# Patient Record
Sex: FEMALE
[De-identification: names, ages, dates, MRNs, and addresses within clinical notes are randomized per-mention and may not be internally consistent; named-entity substitution may affect disease eponyms.]

---

## 2017-08-09 ENCOUNTER — HOSPITAL ENCOUNTER (EMERGENCY)
Dept: HOSPITAL 31 - C.ER | Age: 52
Discharge: LEFT BEFORE DISCHARGE | End: 2017-08-09
Payer: COMMERCIAL

## 2017-08-09 VITALS — HEART RATE: 69 BPM | DIASTOLIC BLOOD PRESSURE: 68 MMHG | TEMPERATURE: 98 F | SYSTOLIC BLOOD PRESSURE: 111 MMHG

## 2017-08-09 VITALS — RESPIRATION RATE: 16 BRPM

## 2017-08-09 DIAGNOSIS — R10.31: Primary | ICD-10-CM

## 2017-08-09 LAB
ALBUMIN/GLOB SERPL: 1.4 {RATIO} (ref 1–2.1)
ALP SERPL-CCNC: 59 U/L (ref 38–126)
ALT SERPL-CCNC: 26 U/L (ref 9–52)
AST SERPL-CCNC: 19 U/L (ref 14–36)
BACTERIA #/AREA URNS HPF: (no result) /[HPF]
BASOPHILS # BLD AUTO: 0.1 K/UL (ref 0–0.2)
BASOPHILS NFR BLD: 1 % (ref 0–2)
BILIRUB SERPL-MCNC: 0.6 MG/DL (ref 0.2–1.3)
BILIRUB UR-MCNC: NEGATIVE MG/DL
BUN SERPL-MCNC: 12 MG/DL (ref 7–17)
CALCIUM SERPL-MCNC: 9.2 MG/DL (ref 8.6–10.4)
CHLORIDE SERPL-SCNC: 102 MMOL/L (ref 98–107)
CO2 SERPL-SCNC: 28 MMOL/L (ref 22–30)
EOSINOPHIL # BLD AUTO: 0.1 K/UL (ref 0–0.7)
EOSINOPHIL NFR BLD: 2 % (ref 0–4)
ERYTHROCYTE [DISTWIDTH] IN BLOOD BY AUTOMATED COUNT: 14.1 % (ref 11.5–14.5)
GLOBULIN SER-MCNC: 2.7 GM/DL (ref 2.2–3.9)
GLUCOSE SERPL-MCNC: 82 MG/DL (ref 65–105)
GLUCOSE UR STRIP-MCNC: NORMAL MG/DL
HCT VFR BLD CALC: 38.7 % (ref 34–47)
KETONES UR STRIP-MCNC: NEGATIVE MG/DL
LEUKOCYTE ESTERASE UR-ACNC: (no result) LEU/UL
LYMPHOCYTES # BLD AUTO: 2.5 K/UL (ref 1–4.3)
LYMPHOCYTES NFR BLD AUTO: 34.7 % (ref 20–40)
MCH RBC QN AUTO: 31.3 PG (ref 27–31)
MCHC RBC AUTO-ENTMCNC: 34 G/DL (ref 33–37)
MCV RBC AUTO: 92.1 FL (ref 81–99)
MONOCYTES # BLD: 0.4 K/UL (ref 0–0.8)
MONOCYTES NFR BLD: 6.1 % (ref 0–10)
NRBC BLD AUTO-RTO: 0 % (ref 0–2)
PH UR STRIP: 6 [PH] (ref 5–8)
PLATELET # BLD: 127 K/UL (ref 130–400)
PMV BLD AUTO: 10.7 FL (ref 7.2–11.7)
POTASSIUM SERPL-SCNC: 3.8 MMOL/L (ref 3.6–5.2)
PROT SERPL-MCNC: 6.7 G/DL (ref 6.3–8.3)
PROT UR STRIP-MCNC: NEGATIVE MG/DL
RBC # UR STRIP: NEGATIVE /UL
RBC #/AREA URNS HPF: 2 /HPF (ref 0–3)
SODIUM SERPL-SCNC: 139 MMOL/L (ref 132–148)
SP GR UR STRIP: 1.02 (ref 1–1.03)
UROBILINOGEN UR-MCNC: NORMAL MG/DL (ref 0.2–1)
WBC # BLD AUTO: 7.1 K/UL (ref 4.8–10.8)
WBC #/AREA URNS HPF: 2 /HPF (ref 0–5)

## 2017-08-09 PROCEDURE — 99285 EMERGENCY DEPT VISIT HI MDM: CPT

## 2017-08-09 PROCEDURE — 80053 COMPREHEN METABOLIC PANEL: CPT

## 2017-08-09 PROCEDURE — 71010: CPT

## 2017-08-09 PROCEDURE — 83690 ASSAY OF LIPASE: CPT

## 2017-08-09 PROCEDURE — 81001 URINALYSIS AUTO W/SCOPE: CPT

## 2017-08-09 PROCEDURE — 85025 COMPLETE CBC W/AUTO DIFF WBC: CPT

## 2017-08-09 PROCEDURE — 96374 THER/PROPH/DIAG INJ IV PUSH: CPT

## 2017-08-09 NOTE — C.PDOC
History Of Present Illness


52 year old female who presents to the ER with a complaint of RLQ pain since 

yesterday afternoon, associated with some nausea. Denies diarrhea, dysuria, or 

hematuria.


Chief Complaint (Nursing): Abdominal Pain


History Per: Patient


History/Exam Limitations: no limitations


Onset/Duration Of Symptoms: Days


Current Symptoms Are (Timing): Still Present


Location Of Pain/Discomfort: RLQ


Radiation Of Pain To:: None


Quality Of Discomfort: Unable To Describe


Associated Symptoms: Nausea.  denies: Fever, Chills, Vomiting, Diarrhea, 

Urinary Symptoms


Exacerbating Factors: None


Alleviating Factors: None


Recent travel outside of the [place]: No


Abnormal Vaginal Bleeding: No





Past Medical History


Reviewed: Historical Data, Nursing Documentation, Vital Signs


Vital Signs: 


 Last Vital Signs











Temp  98 F   08/09/17 21:35


 


Pulse  69   08/09/17 21:35


 


Resp  16   08/09/17 21:35


 


BP  111/68   08/09/17 21:35


 


Pulse Ox  100   08/09/17 21:35














- Medical History


PMH: Anemia


Surgical History: No Surg Hx


Family History: States: Unknown Family Hx





- Social History


Hx Alcohol Use: No


Hx Substance Use: No





- Immunization History


Hx Influenza Vaccination: Yes





Review Of Systems


Constitutional: Negative for: Fever, Chills


Gastrointestinal: Positive for: Nausea, Abdominal Pain.  Negative for: Vomiting

, Diarrhea


Genitourinary: Negative for: Dysuria, Hematuria





Physical Exam





- Physical Exam


Appears: Non-toxic


Skin: Normal Color, Warm, Dry


Head: Atraumatic, Normacephalic


Oral Mucosa: Moist


Chest: Symmetrical, No Tenderness


Cardiovascular: Rhythm Regular, No Murmur


Respiratory: Normal Breath Sounds, No Rales, No Rhonchi, No Wheezing


Gastrointestinal/Abdominal: Soft, Tenderness (RLQ), No Guarding, No Rebound


Neurological/Psych: Oriented x3, Normal Speech, Normal Cognition





ED Course And Treatment





- Laboratory Results


Result Diagrams: 


 08/09/17 19:27





 08/09/17 19:27


O2 Sat by Pulse Oximetry: 97 (Room air)


Pulse Ox Interpretation: Normal


Progress Note: CT abd/pel and CXR ordered. Toradol and IV fluids administered.  

Patient refuses further work up or CT; she states she feels better and will 

sign out AMA.





Against Medical Advice





- AMA


Patient Left Against Medical Advice: 


The patient declines admission to the hospital and wishes to leave the 

Emergency Department.  This action is against my medical advice. This decision 

was made with informed refusal. The patient was told that admission to the 

hospital is necessary.  Explanation of the reasons why were discussed.  


The risks of leaving were explained to the patient and include, but are not 

limited to, worsening of known or currently unknown conditions, permanent 

disability and death from undiagnosed or untreated conditions. 


The patient has the capacity to make this informed decision and understands my 

explanation of the current medical problem and risks of leaving. 


The patient voluntarily accepts these risks and signed an AMA form documenting 

our conversation.


The patient was given the opportunity to ask questions and reconsider.  The 

patient was encouraged to return to the Emergency Department at any time for 

further care.








Disposition


Counseled Patient/Family Regarding: Diagnosis





- Disposition


Disposition: AGAINST MEDICAL ADVICE


Disposition Time: 21:25


Condition: STABLE


Forms:  [facility] (English)





- POA


Present On Arrival: None





- Clinical Impression


Clinical Impression: 


 Abdominal pain








- Scribe Statement


The provider has reviewed the documentation as recorded by the Scribjocelyn Pitt





All medical record entries made by the Justin were at my direction and 

personally dictated by me. I have reviewed the chart and agree that the record 

accurately reflects my personal performance of the history, physical exam, 

medical decision making, and the department course for this patient. I have 

also personally directed, reviewed, and agree with the discharge instructions 

and disposition.

## 2017-08-10 VITALS — OXYGEN SATURATION: 97 %

## 2017-08-10 NOTE — RAD
PROCEDURE:  CHEST RADIOGRAPH, 1 VIEW



HISTORY:

Abdominal pain 



COMPARISON:

None available.



FINDINGS:



LUNGS:

Mild venous congestion.  Bibasilar breast and nipple shadows.  Small 

nodular density at the right lung base may represent vessel on end.  

Biapical pleural thickening with upper lobe granulomatous changes.



PLEURA:

No pneumothorax or pleural fluid seen.



CARDIOVASCULAR:

Normal.



OSSEOUS STRUCTURES:

No significant abnormalities.



VISUALIZED UPPER ABDOMEN:

Normal.



OTHER FINDINGS:

None. 



IMPRESSION:

Mild venous congestion.  Bibasilar breast and nipple shadows.  Small 

nodular density at the right lung base may represent vessel on end.  

Biapical pleural thickening with upper lobe granulomatous changes.

## 2018-04-30 ENCOUNTER — HOSPITAL ENCOUNTER (EMERGENCY)
Dept: HOSPITAL 31 - C.ER | Age: 53
Discharge: HOME | End: 2018-04-30
Payer: COMMERCIAL

## 2018-04-30 VITALS
RESPIRATION RATE: 18 BRPM | SYSTOLIC BLOOD PRESSURE: 120 MMHG | DIASTOLIC BLOOD PRESSURE: 72 MMHG | TEMPERATURE: 98 F | HEART RATE: 70 BPM

## 2018-04-30 VITALS — OXYGEN SATURATION: 98 %

## 2018-04-30 DIAGNOSIS — D64.9: ICD-10-CM

## 2018-04-30 DIAGNOSIS — R55: Primary | ICD-10-CM

## 2018-04-30 DIAGNOSIS — R10.9: ICD-10-CM

## 2018-04-30 LAB
ALBUMIN SERPL-MCNC: 4.1 G/DL (ref 3.5–5)
ALBUMIN/GLOB SERPL: 1.3 {RATIO} (ref 1–2.1)
ALT SERPL-CCNC: 14 U/L (ref 9–52)
AST SERPL-CCNC: 23 U/L (ref 14–36)
BACTERIA #/AREA URNS HPF: (no result) /[HPF]
BASOPHILS # BLD AUTO: 0.1 K/UL (ref 0–0.2)
BASOPHILS NFR BLD: 1.2 % (ref 0–2)
BILIRUB UR-MCNC: NEGATIVE MG/DL
BUN SERPL-MCNC: 14 MG/DL (ref 7–17)
CALCIUM SERPL-MCNC: 9.1 MG/DL (ref 8.6–10.4)
EOSINOPHIL # BLD AUTO: 0.2 K/UL (ref 0–0.7)
EOSINOPHIL NFR BLD: 2.3 % (ref 0–4)
ERYTHROCYTE [DISTWIDTH] IN BLOOD BY AUTOMATED COUNT: 13.9 % (ref 11.5–14.5)
GFR NON-AFRICAN AMERICAN: > 60
GLUCOSE UR STRIP-MCNC: NORMAL MG/DL
HGB BLD-MCNC: 14.1 G/DL (ref 11–16)
LEUKOCYTE ESTERASE UR-ACNC: (no result) LEU/UL
LIPASE: 74 U/L (ref 23–300)
LYMPHOCYTES # BLD AUTO: 2.6 K/UL (ref 1–4.3)
LYMPHOCYTES NFR BLD AUTO: 37.5 % (ref 20–40)
MCH RBC QN AUTO: 31.8 PG (ref 27–31)
MCHC RBC AUTO-ENTMCNC: 34.9 G/DL (ref 33–37)
MCV RBC AUTO: 91.1 FL (ref 81–99)
MONOCYTES # BLD: 0.4 K/UL (ref 0–0.8)
MONOCYTES NFR BLD: 6.1 % (ref 0–10)
NEUTROPHILS # BLD: 3.6 K/UL (ref 1.8–7)
NEUTROPHILS NFR BLD AUTO: 52.9 % (ref 50–75)
NRBC BLD AUTO-RTO: 0 % (ref 0–2)
PH UR STRIP: 6 [PH] (ref 5–8)
PLATELET # BLD: 159 K/UL (ref 130–400)
PMV BLD AUTO: 10.7 FL (ref 7.2–11.7)
PROT UR STRIP-MCNC: NEGATIVE MG/DL
RBC # BLD AUTO: 4.44 MIL/UL (ref 3.8–5.2)
RBC # UR STRIP: NEGATIVE /UL
SP GR UR STRIP: 1.02 (ref 1–1.03)
SQUAMOUS EPITHIAL: 8 /HPF (ref 0–5)
UROBILINOGEN UR-MCNC: 2 MG/DL (ref 0.2–1)
WBC # BLD AUTO: 6.8 K/UL (ref 4.8–10.8)

## 2018-04-30 PROCEDURE — 85025 COMPLETE CBC W/AUTO DIFF WBC: CPT

## 2018-04-30 PROCEDURE — 99285 EMERGENCY DEPT VISIT HI MDM: CPT

## 2018-04-30 PROCEDURE — 96361 HYDRATE IV INFUSION ADD-ON: CPT

## 2018-04-30 PROCEDURE — 93005 ELECTROCARDIOGRAM TRACING: CPT

## 2018-04-30 PROCEDURE — 81001 URINALYSIS AUTO W/SCOPE: CPT

## 2018-04-30 PROCEDURE — 74177 CT ABD & PELVIS W/CONTRAST: CPT

## 2018-04-30 PROCEDURE — 83690 ASSAY OF LIPASE: CPT

## 2018-04-30 PROCEDURE — 80053 COMPREHEN METABOLIC PANEL: CPT

## 2018-04-30 PROCEDURE — 96374 THER/PROPH/DIAG INJ IV PUSH: CPT

## 2018-04-30 NOTE — CT
PROCEDURE:  CT Abdomen and Pelvis with contrast



HISTORY:

RLQ abd pain



COMPARISON:

None.



TECHNIQUE:

Contrast dose: 100 mL Visipaque 320



Radiation dose:



Total exam DLP = 308.70 mGy-cm.



This CT exam was performed using one or more of the following dose 

reduction techniques: Automated exposure control, adjustment of the 

mA and/or kV according to patient size, and/or use of iterative 

reconstruction technique.



FINDINGS:



LOWER THORAX:

Unremarkable. 



LIVER:

Unremarkable. No gross lesion or ductal dilatation. 



GALLBLADDER AND BILE DUCTS:

Unremarkable. 



PANCREAS:

Unremarkable. No gross lesion or ductal dilatation.



SPLEEN:

Unremarkable. 



ADRENALS:

Unremarkable. No mass. 



KIDNEYS AND URETERS:

Unremarkable. No hydronephrosis. No solid mass. 



VASCULATURE:

Pelvic varices. No evidence of abdominal aortic aneurysm. 



BOWEL:

No evidence of bowel obstruction. 



APPENDIX:

Not identified. No secondary findings. 



PERITONEUM:

Unremarkable. No free fluid. No free air. 



LYMPH NODES:

Unremarkable. No enlarged lymph nodes. 



BLADDER:

Unremarkable. 



REPRODUCTIVE:

Unremarkable. 



BONES:

No acute fracture. 



OTHER FINDINGS:

None.



IMPRESSION:

No acute abnormality.  Incidentally noted pelvic varices. Please 

correlate with any symptoms suggestive of pelvic congestion syndrome. 

Otherwise unremarkable.

## 2018-04-30 NOTE — C.PDOC
History Of Present Illness


52-year-old female, presents to the emergency department with complaints of 

syncopal episode today while she was at work. Patient states she has not been 

feeling like herself for a week. She is not sleeping well and has poor appetite 

for the past two days. Patient states she is feeling exhausted. Today she 

developed a sharp pain to right lower quadrant, and is not sure if that caused 

her syncope. Denies nausea/vomiting, fever or chills. Of note, patients last 

bowel movement was yesterday.


Time Seen by Provider: 18 11:53


Chief Complaint (Nursing): Weakness/Neurological Deficit


History Per: Patient


History/Exam Limitations: no limitations


Current Symptoms Are (Timing): Still Present


Severity: Moderate





Past Medical History


Reviewed: Historical Data, Nursing Documentation, Vital Signs


Vital Signs: 


 Last Vital Signs











Temp  98 F   18 16:40


 


Pulse  70   18 16:40


 


Resp  18   18 16:40


 


BP  120/72   18 16:40


 


Pulse Ox  99   18 16:40














- Medical History


PMH: Anemia


Family History: States: No Known Family Hx





- Social History


Hx Alcohol Use: No


Hx Substance Use: No





- Immunization History


Hx Influenza Vaccination: Yes





Physical Exam





- Physical Exam


Appears: Non-toxic, No Acute Distress, Other (drinking coffee)


Skin: Normal Color, Warm, Dry, No Rash


Head: Normacephalic


Eye(s): bilateral: PERRL


Nose: Normal


Oral Mucosa: Moist


Lips: Normal Appearing


Neck: Normal ROM


Chest: Symmetrical


Cardiovascular: Rhythm Regular


Respiratory: Normal Breath Sounds, No Accessory Muscle Use


Gastrointestinal/Abdominal: Soft, Tenderness (RLQ), No Guarding, No Rebound


Extremity: Normal ROM, No Deformity, No Swelling


Neurological/Psych: Oriented x3, Normal Speech





ED Course And Treatment





- Laboratory Results


Result Diagrams: 


 18 12:23





 18 12:23


Lab Interpretation: No Acute Changes


ECG: Interpreted By Me, Viewed By Me


ECG Rhythm: Sinus Rhythm


ECG Interpretation: No Acute Changes


Rate From EC


O2 Sat by Pulse Oximetry: 98 (RA)


Pulse Ox Interpretation: Normal





- CT Scan/US


  ** CT abd/pel


Other Rad Studies (CT/US): Read By Radiologist, Radiology Report Reviewed


CT/US Interpretation: Accession No. : [id number].  Patient Name / ID : 

WONG GANN  / [id number].  Exam Date : 2018 14:51:23 ( Approved ).  

Study Comment :  Sex / Age : F  / 52Y.  Creator : Steph Leary.  Dictator : 

Artemio Ortega MD.   :  Approver : Artemio Ortega MD.  

Approver2 :  Report Date : 2018 15:09:06.  My Comment :  *****************

******************************************************************.  PROCEDURE:

  CT Abdomen and Pelvis with contrast.  HISTORY:  RLQ abd pain.  COMPARISON:  

None.  TECHNIQUE:  Contrast dose: 100 mL Visipaque 320.  Radiation dose:  Total 

exam DLP = 308.70 mGy-cm.  This CT exam was performed using one or more of the 

following dose reduction techniques: Automated exposure control, adjustment of 

the mA and/or kV according to patient size, and/or use of iterative 

reconstruction technique.  FINDINGS:  LOWER THORAX:  Unremarkable.  LIVER:  

Unremarkable. No gross lesion or ductal dilatation.  GALLBLADDER AND BILE DUCTS

:  Unremarkable.  PANCREAS:  Unremarkable. No gross lesion or ductal 

dilatation.  SPLEEN:  Unremarkable.  ADRENALS:  Unremarkable. No mass.  KIDNEYS 

AND URETERS:  Unremarkable. No hydronephrosis. No solid mass.  VASCULATURE:  

Pelvic varices. No evidence of abdominal aortic aneurysm.  BOWEL:  No evidence 

of bowel obstruction.  APPENDIX:  Not identified. No secondary findings.  

PERITONEUM:  Unremarkable. No free fluid. No free air.  LYMPH NODES:  

Unremarkable. No enlarged lymph nodes.  BLADDER:  Unremarkable.  REPRODUCTIVE:  

Unremarkable.  BONES:  No acute fracture.  OTHER FINDINGS:  None.  IMPRESSION:  

No acute abnormality.  Incidentally noted pelvic varices. Please correlate with 

any symptoms suggestive of pelvic congestion syndrome. Otherwise unremarkable.





Medical Decision Making


Medical Decision Making: 


Impression: Abdominal pain, syncope





Plan:


* CT Abd/Pel


* EKG


* Bloodwork


* IVF, Toradol


* UA


* Reassess and Disposition





Progress:


Labs reviewed and no leukocytosis or acute abnormality. 


CT shows no acute abnormality. Denies any pelvic pain or vaginal complaints. 


Patient re-evaluated seated comfortably in no distress. She reports feeling 

better. She has stable vital signs. Discussed results with patient, and copy of 

report was provided.  Patient feels comfortable going home and will be 

discharged. Patient given follow up instructions. Instructed to return to ER if 

symptoms worsen or new symptoms arise.





Disposition


Counseled Patient/Family Regarding: Studies Performed, Diagnosis, Need For 

Followup





- Disposition


Referrals: 


Denny Shaffer DO [Staff Provider] - 


Disposition: HOME/ ROUTINE


Disposition Time: 16:02


Condition: STABLE


Additional Instructions: 


Follow up with your primary medical doctor in 2-5 days for further evaluation. 

Return to the emergency department at any time if symptoms persist or worsen.





Instructions:  Acute Abdomen (Belly Pain), Adult (DC), Syncope (Fainting) (DC)


Forms:  [facility] (English), Work Excuse





- POA


Present On Arrival: None





- Clinical Impression


Clinical Impression: 


 Syncope, Abdominal pain








- Scribe Statement


The provider has reviewed the documentation as recorded by the Scribe (Laila Dalton)








All medical record entries made by the Scribe were at my direction and 

personally dictated by me. I have reviewed the chart and agree that the record 

accurately reflects my personal performance of the history, physical exam, 

medical decision making, and the department course for this patient. I have 

also personally directed, reviewed, and agree with the discharge instructions 

and disposition.

## 2018-05-01 NOTE — CARD
--------------- APPROVED REPORT --------------





EKG Measurement

Heart [id number]

[id number]

[id number]

[id number]

[id number]



<Conclusion>

Normal sinus rhythm with sinus arrhythmia

Nonspecific ST and T wave abnormality

LVH

Abnormal ECG